# Patient Record
Sex: MALE | Race: BLACK OR AFRICAN AMERICAN | Employment: FULL TIME | URBAN - METROPOLITAN AREA
[De-identification: names, ages, dates, MRNs, and addresses within clinical notes are randomized per-mention and may not be internally consistent; named-entity substitution may affect disease eponyms.]

---

## 2020-09-27 ENCOUNTER — HOSPITAL ENCOUNTER (EMERGENCY)
Facility: CLINIC | Age: 20
Discharge: HOME | End: 2020-09-27
Attending: EMERGENCY MEDICINE | Admitting: EMERGENCY MEDICINE
Payer: COMMERCIAL

## 2020-09-27 ENCOUNTER — APPOINTMENT (OUTPATIENT)
Dept: CT IMAGING | Facility: CLINIC | Age: 20
End: 2020-09-27
Attending: EMERGENCY MEDICINE
Payer: COMMERCIAL

## 2020-09-27 VITALS
BODY MASS INDEX: 23.32 KG/M2 | TEMPERATURE: 98.4 F | OXYGEN SATURATION: 97 % | SYSTOLIC BLOOD PRESSURE: 119 MMHG | RESPIRATION RATE: 16 BRPM | WEIGHT: 153.88 LBS | DIASTOLIC BLOOD PRESSURE: 71 MMHG | HEART RATE: 71 BPM | HEIGHT: 68 IN

## 2020-09-27 DIAGNOSIS — R10.13 ABDOMINAL PAIN, EPIGASTRIC: ICD-10-CM

## 2020-09-27 DIAGNOSIS — I88.0 MESENTERIC ADENITIS: ICD-10-CM

## 2020-09-27 LAB
ALBUMIN SERPL-MCNC: 3.9 G/DL (ref 3.4–5)
ALP SERPL-CCNC: 69 U/L (ref 40–150)
ALT SERPL W P-5'-P-CCNC: 18 U/L (ref 0–70)
ANION GAP SERPL CALCULATED.3IONS-SCNC: 3 MMOL/L (ref 3–14)
AST SERPL W P-5'-P-CCNC: 12 U/L (ref 0–45)
BASOPHILS # BLD AUTO: 0.1 10E9/L (ref 0–0.2)
BASOPHILS NFR BLD AUTO: 1 %
BILIRUB SERPL-MCNC: 0.6 MG/DL (ref 0.2–1.3)
BUN SERPL-MCNC: 5 MG/DL (ref 7–30)
CALCIUM SERPL-MCNC: 8.6 MG/DL (ref 8.5–10.1)
CHLORIDE SERPL-SCNC: 106 MMOL/L (ref 94–109)
CO2 SERPL-SCNC: 29 MMOL/L (ref 20–32)
CREAT SERPL-MCNC: 0.74 MG/DL (ref 0.66–1.25)
DIFFERENTIAL METHOD BLD: ABNORMAL
EOSINOPHIL # BLD AUTO: 0.8 10E9/L (ref 0–0.7)
EOSINOPHIL NFR BLD AUTO: 10.9 %
ERYTHROCYTE [DISTWIDTH] IN BLOOD BY AUTOMATED COUNT: 13.2 % (ref 10–15)
GFR SERPL CREATININE-BSD FRML MDRD: >90 ML/MIN/{1.73_M2}
GLUCOSE SERPL-MCNC: 92 MG/DL (ref 70–99)
HCT VFR BLD AUTO: 44.4 % (ref 40–53)
HGB BLD-MCNC: 15.1 G/DL (ref 13.3–17.7)
IMM GRANULOCYTES # BLD: 0 10E9/L (ref 0–0.4)
IMM GRANULOCYTES NFR BLD: 0.3 %
LIPASE SERPL-CCNC: 166 U/L (ref 73–393)
LYMPHOCYTES # BLD AUTO: 2.5 10E9/L (ref 0.8–5.3)
LYMPHOCYTES NFR BLD AUTO: 36.2 %
MCH RBC QN AUTO: 28.4 PG (ref 26.5–33)
MCHC RBC AUTO-ENTMCNC: 34 G/DL (ref 31.5–36.5)
MCV RBC AUTO: 84 FL (ref 78–100)
MONOCYTES # BLD AUTO: 0.7 10E9/L (ref 0–1.3)
MONOCYTES NFR BLD AUTO: 9.5 %
NEUTROPHILS # BLD AUTO: 2.9 10E9/L (ref 1.6–8.3)
NEUTROPHILS NFR BLD AUTO: 42.1 %
NRBC # BLD AUTO: 0 10*3/UL
NRBC BLD AUTO-RTO: 0 /100
PLATELET # BLD AUTO: 265 10E9/L (ref 150–450)
POTASSIUM SERPL-SCNC: 3.7 MMOL/L (ref 3.4–5.3)
PROT SERPL-MCNC: 7.3 G/DL (ref 6.8–8.8)
RBC # BLD AUTO: 5.31 10E12/L (ref 4.4–5.9)
SODIUM SERPL-SCNC: 138 MMOL/L (ref 133–144)
WBC # BLD AUTO: 7 10E9/L (ref 4–11)

## 2020-09-27 PROCEDURE — 83690 ASSAY OF LIPASE: CPT | Performed by: EMERGENCY MEDICINE

## 2020-09-27 PROCEDURE — 96375 TX/PRO/DX INJ NEW DRUG ADDON: CPT

## 2020-09-27 PROCEDURE — 25000125 ZZHC RX 250: Performed by: EMERGENCY MEDICINE

## 2020-09-27 PROCEDURE — 99285 EMERGENCY DEPT VISIT HI MDM: CPT | Mod: 25

## 2020-09-27 PROCEDURE — 25000128 H RX IP 250 OP 636: Performed by: EMERGENCY MEDICINE

## 2020-09-27 PROCEDURE — 96374 THER/PROPH/DIAG INJ IV PUSH: CPT | Mod: 59

## 2020-09-27 PROCEDURE — 85025 COMPLETE CBC W/AUTO DIFF WBC: CPT | Performed by: EMERGENCY MEDICINE

## 2020-09-27 PROCEDURE — 96361 HYDRATE IV INFUSION ADD-ON: CPT

## 2020-09-27 PROCEDURE — 74177 CT ABD & PELVIS W/CONTRAST: CPT

## 2020-09-27 PROCEDURE — 25000132 ZZH RX MED GY IP 250 OP 250 PS 637: Performed by: EMERGENCY MEDICINE

## 2020-09-27 PROCEDURE — 25800030 ZZH RX IP 258 OP 636: Performed by: EMERGENCY MEDICINE

## 2020-09-27 PROCEDURE — 80053 COMPREHEN METABOLIC PANEL: CPT | Performed by: EMERGENCY MEDICINE

## 2020-09-27 RX ORDER — KETOROLAC TROMETHAMINE 15 MG/ML
15 INJECTION, SOLUTION INTRAMUSCULAR; INTRAVENOUS ONCE
Status: COMPLETED | OUTPATIENT
Start: 2020-09-27 | End: 2020-09-27

## 2020-09-27 RX ORDER — ONDANSETRON 2 MG/ML
4 INJECTION INTRAMUSCULAR; INTRAVENOUS EVERY 30 MIN PRN
Status: DISCONTINUED | OUTPATIENT
Start: 2020-09-27 | End: 2020-09-28 | Stop reason: HOSPADM

## 2020-09-27 RX ORDER — ONDANSETRON 4 MG/1
4 TABLET, ORALLY DISINTEGRATING ORAL EVERY 8 HOURS PRN
Qty: 10 TABLET | Refills: 0 | Status: SHIPPED | OUTPATIENT
Start: 2020-09-27 | End: 2020-09-30

## 2020-09-27 RX ORDER — HYDROCODONE BITARTRATE AND ACETAMINOPHEN 5; 325 MG/1; MG/1
1 TABLET ORAL EVERY 6 HOURS PRN
Qty: 8 TABLET | Refills: 0 | Status: SHIPPED | OUTPATIENT
Start: 2020-09-27 | End: 2020-09-30

## 2020-09-27 RX ORDER — IOPAMIDOL 755 MG/ML
76 INJECTION, SOLUTION INTRAVASCULAR ONCE
Status: COMPLETED | OUTPATIENT
Start: 2020-09-27 | End: 2020-09-27

## 2020-09-27 RX ADMIN — KETOROLAC TROMETHAMINE 15 MG: 15 INJECTION, SOLUTION INTRAMUSCULAR; INTRAVENOUS at 21:47

## 2020-09-27 RX ADMIN — LIDOCAINE HYDROCHLORIDE 30 ML: 20 SOLUTION ORAL; TOPICAL at 20:24

## 2020-09-27 RX ADMIN — SODIUM CHLORIDE 1000 ML: 9 INJECTION, SOLUTION INTRAVENOUS at 20:19

## 2020-09-27 RX ADMIN — ONDANSETRON 4 MG: 2 INJECTION INTRAMUSCULAR; INTRAVENOUS at 20:23

## 2020-09-27 RX ADMIN — SODIUM CHLORIDE 62 ML: 9 INJECTION, SOLUTION INTRAVENOUS at 21:19

## 2020-09-27 RX ADMIN — IOPAMIDOL 76 ML: 755 INJECTION, SOLUTION INTRAVENOUS at 21:18

## 2020-09-27 ASSESSMENT — ENCOUNTER SYMPTOMS
CHILLS: 0
DIARRHEA: 1
VOMITING: 0
NAUSEA: 1
DYSURIA: 0
ABDOMINAL PAIN: 1
FREQUENCY: 0
FEVER: 0

## 2020-09-27 ASSESSMENT — MIFFLIN-ST. JEOR: SCORE: 1682.5

## 2020-09-27 NOTE — ED AVS SNAPSHOT
Emergency Department  [address] [place] [address]  Phone:  [phone]  Fax:  [phone]                                    Catalina Casanova   MRN: [id number]    Department:   Emergency Department   Date of Visit:  9/27/2020           After Visit Summary Signature Page    I have received my discharge instructions, and my questions have been answered. I have discussed any challenges I see with this plan with the nurse or doctor.    ..........................................................................................................................................  Patient/Patient Representative Signature      ..........................................................................................................................................  Patient Representative Print Name and Relationship to Patient    ..................................................               ................................................  Date                                   Time    ..........................................................................................................................................  Reviewed by Signature/Title    ...................................................              ..............................................  Date                                               Time          22EPIC Rev 08/18

## 2020-09-28 NOTE — ED TRIAGE NOTES
Pt c/o RUQ 7/10 continuous abdominal pain that started 3 days ago and has progressively worsened. Denies any n/v/d.

## 2020-09-28 NOTE — ED PROVIDER NOTES
"  History     Chief Complaint:  Abdominal Pain      HPI   Catalina Casanova is a 19 year old male who presents with abdominal pain.  The patient reports waking up with epigastric abdominal pain and back pain 3 days ago.  Since then he has had ongoing epigastric pain which perhaps resolves for a couple hours in the evening but otherwise constant and waxing and waning in intensity.  His pain tends to be worse after eating.  He had diarrhea the first day he had pain and his stools are still looser but not actually diarrhea at this point.  He has been nauseous but denies any vomiting, fevers, or urinary symptoms.  He does not take NSAIDs regularly.  He tried Pepto Bismol without improvement.    Allergies:  No known drug allergies.      Medications:    Albuterol   Loratadine  Singulair      Past Medical History:    Seasonal allergies     Past Surgical History:    Tonsillectomy with adenoidectomy     Family History:    History reviewed. No pertinent family history.     Social History:  Presents to the ED with his boyfriend.  Tobacco Use: Current smoker  Alcohol Use: Occasional alcohol use.      Review of Systems   Constitutional: Negative for chills and fever.   Gastrointestinal: Positive for abdominal pain, diarrhea and nausea. Negative for vomiting.   Genitourinary: Negative for dysuria and frequency.   All other systems reviewed and are negative.    Physical Exam   First Vitals:  BP: 132/76  Pulse: 69  Temp: 98.4  F (36.9  C)  Resp: 20  Height: 172.7 cm (5' 8\")  Weight: 69.8 kg (153 lb 14.1 oz)  SpO2: 96 %      Physical Exam  Eyes:  The pupils are equal and round    Conjunctivae and sclerae are normal  ENT:    The nose is normal    Pinnae are normal  CV:  Regular rate and rhythm     No edema  Resp:  Lungs are clear    Non-labored    No rales    No wheezing   GI:  Abdomen is soft with tenderness in the mid and upper abdomen, there is no rigidity    No distension    No rebound tenderness   MS:  Normal muscular tone    No " asymmetric leg swelling  Skin:  No rash or acute skin lesions noted  Neuro:   Awake, alert.      Speech is normal and fluent.    Face is symmetric.     Moves all extremities      Emergency Department Course     Imaging:  Abdomen/Pelvis CT with IV contrast:  1.  Upper-normal size of peritoneal mesenteric lymph nodes could be associated with mesenteric adenitis or other inflammatory process. No overt lymphadenopathy is seen.   2.  Appendix is not well seen although no definite pericecal inflammatory changes are identified.   3.   Small bowel contains fluid and is minimally prominent but is still well within normal size criterion. Enteritis is not excluded.   4.   Etiology for patient's symptoms is not definitely identified.   Report per radiology.      Radiographic findings were communicated with the patient who voiced understanding of the findings.     Laboratory:  CBC: WNL (WBC 7, HGB 15.1, )   CMP: BUN 5 (L), otherwise WNL (Creatinine 0.74)  Lipase: 166    Interventions:   (2019) Normal Saline, 1 liter, IV bolus   (2023) Zofran, 4 mg, IV injection   (2024) GI Cocktail (Maalox/Mylanta and viscous Lidocaine), 30 mL suspension, PO    (2147) Toradol, 15 mg, IV injection     Emergency Department Course:  Nursing notes and vitals reviewed.  I performed an exam of the patient as documented above.    A peripheral IV was established. Blood was drawn from the patient. This was sent for laboratory testing, findings above.       The patient was sent for a CT scan of the abdomen and pelvis while in the emergency department, findings above.      Findings and plan explained to the patient. Patient discharged home with instructions regarding supportive care, medications, and reasons to return. The importance of close follow-up was reviewed. The patient was prescribed Norco and Zofran.     Impression & Plan      Medical Decision Making:  MihirJadelore Casanova is a 19 year old male who presents the emergency department with  abdominal pain, nausea and loose stools.  He had diarrhea on the first day of his symptoms.  He here appears clinically well.  He has tenderness in his epigastric and mid abdomen.  Labs were obtained which did not reveal any significant abnormalities.  He was still having discomfort so CT scan was performed of his abdomen pelvis.  He is not note any improvement after GI cocktail.  He did note some improvement eventually after a dose of Toradol.  CT scan did not show any significant acute abnormalities.  There is some evidence of possibly gastroenteritis or diarrhea.  There is some enlarged lymph nodes in his abdomen raising the possibility of mesenteric adenitis.  Overall he appears well.  His blood work is reassuring and CT scan did not reveal any concerning abnormalities.  He was given a few doses of pain medication and antinausea medication to use at home.  He should follow-up with his doctor.  Return with any new or worsening symptoms.    Diagnosis:    ICD-10-CM    1. Mesenteric adenitis  I88.0    2. Abdominal pain, epigastric  R10.13        Disposition:  Discharged to home.     Discharge Medications:  New Prescriptions    HYDROCODONE-ACETAMINOPHEN (NORCO) 5-325 MG TABLET    Take 1 tablet by mouth every 6 hours as needed for breakthrough pain or severe pain    ONDANSETRON (ZOFRAN ODT) 4 MG ODT TAB    Take 1 tablet (4 mg) by mouth every 8 hours as needed       I, Manuela Gunderson, am serving as a scribe on 9/27/2020 at 7:56 PM to personally document services performed by Emanuel Hernandez based on my observations and the provider's statements to me.   Manuela Gunderson  9/27/2020    EMERGENCY DEPARTMENT       Emanuel Hernandez MD  09/28/20 0107

## 2021-01-16 ENCOUNTER — NURSE TRIAGE (OUTPATIENT)
Dept: NURSING | Facility: CLINIC | Age: 21
End: 2021-01-16

## 2021-01-17 ENCOUNTER — HOSPITAL ENCOUNTER (EMERGENCY)
Facility: CLINIC | Age: 21
Discharge: HOME | End: 2021-01-17
Attending: EMERGENCY MEDICINE | Admitting: EMERGENCY MEDICINE
Payer: COMMERCIAL

## 2021-01-17 VITALS
OXYGEN SATURATION: 100 % | HEART RATE: 94 BPM | DIASTOLIC BLOOD PRESSURE: 82 MMHG | SYSTOLIC BLOOD PRESSURE: 142 MMHG | RESPIRATION RATE: 14 BRPM | TEMPERATURE: 98.8 F

## 2021-01-17 DIAGNOSIS — H61.23 BILATERAL IMPACTED CERUMEN: ICD-10-CM

## 2021-01-17 DIAGNOSIS — H91.92 HEARING PROBLEM OF LEFT EAR: ICD-10-CM

## 2021-01-17 PROCEDURE — 99283 EMERGENCY DEPT VISIT LOW MDM: CPT

## 2021-01-17 PROCEDURE — 250N000009 HC RX 250: Performed by: EMERGENCY MEDICINE

## 2021-01-17 RX ORDER — ACETAMINOPHEN 160 MG
100 TABLET,DISINTEGRATING ORAL ONCE
Status: COMPLETED | OUTPATIENT
Start: 2021-01-17 | End: 2021-01-17

## 2021-01-17 RX ADMIN — HYDROGEN PEROXIDE 100 ML: 30 LIQUID TOPICAL at 01:45

## 2021-01-17 NOTE — DISCHARGE INSTRUCTIONS
*You may resume diet and activities.  *No new medications.  *Return if you develop fever, ear pain, recurrent hearing changes or become worse in any way.

## 2021-01-17 NOTE — ED AVS SNAPSHOT
[facility] Emergency Dept  [address] [address]  Phone: [phone]  Fax: [phone]                                    Mihir Casanova   MRN: [id number]    Department: [facility] Emergency Dept   Date of Visit: 1/17/2021           After Visit Summary Signature Page    I have received my discharge instructions, and my questions have been answered. I have discussed any challenges I see with this plan with the nurse or doctor.    ..........................................................................................................................................  Patient/Patient Representative Signature      ..........................................................................................................................................  Patient Representative Print Name and Relationship to Patient    ..................................................               ................................................  Date                                   Time    ..........................................................................................................................................  Reviewed by Signature/Title    ...................................................              ..............................................  Date                                               Time          22EPIC Rev 08/18

## 2021-01-17 NOTE — TELEPHONE ENCOUNTER
Mihir Shon calls and says that he woke up this morning and could not hear anything out of his left ear. Pt. Says that he put ear drops and hydrogen peroxide in his ear to help him, but that did not help him. Pt. Says that he is very worried and is on his way to an ER now. COVID 19 Nurse Triage Plan/Patient Instructions    Please be aware that novel coronavirus (COVID-19) may be circulating in the community. If you develop symptoms such as fever, cough, or SOB or if you have concerns about the presence of another infection including coronavirus (COVID-19), please contact your health care provider or visit [email or web address].     Disposition/Instructions    In-Person Visit with provider recommended. Reference Visit Selection Guide.    Thank you for taking steps to prevent the spread of this virus.  o Limit your contact with others.  o Wear a simple mask to cover your cough.  o Wash your hands well and often.    Resources    [facility]: About COVID-19: [email or web address]    CDC: What to Do If You're Sick: www.cdc.gov/coronavirus/2019-ncov/about/steps-when-sick.html    CDC: Ending Home Isolation: www.cdc.gov/coronavirus/2019-ncov/hcp/disposition-in-home-patients.html     CDC: Caring for Someone: www.cdc.gov/coronavirus/2019-ncov/if-you-are-sick/care-for-someone.html     [facility]: Interim Guidance for Hospital Discharge to Home: www.health.[facility].mn.us/diseases/coronavirus/hcp/hospdischarge.pdf    [facility] clinical trials (COVID-19 research studies): [email or web address]/umn-[email or web address]-trials     Below are the COVID-19 hotlines at the [facility] of Health ([facility]). Interpreters are available.   o For health questions: Call 086-484-4959 or 1-469.295.9998 (7 a.m. to 7 p.m.)  o For questions about schools and childcare: Call 558-980-3658 or 1-765.410.5129 (7 a.m. to 7 p.m.)                     Reason for Disposition    [1] Nasal allergies AND [2] only certain times of year AND [3] hay fever  diagnosis has never been confirmed by a HCP    Additional Information    Negative: Followed an ear injury    Decreased hearing with nasal allergies    Negative: [1] Wheezing (high pitched whistling sound) AND [2] previous asthma attacks or use of asthma medicines    Negative: [1] Wheezing (high pitched whistling sound) AND [2] no history of asthma    Negative: Eye redness and itching are the only symptoms    Negative: Doesn't match the SYMPTOMS for Hay Fever    Negative: Patient sounds very sick or weak to the triager    Negative: Lots of coughing    Negative: [1] Lots of yellow or green discharge from nose AND [2] present > 3 days    Negative: [1] Taking antihistamines > 2 days AND [2] nasal allergy symptoms interfere with sleep, school, or work    Protocols used: NASAL ALLERGIES (HAY FEVER)-A-AH, HEARING LOSS OR CHANGE-A-AH

## 2021-01-17 NOTE — ED TRIAGE NOTES
Pt has pain to left ear and is unable to hear from it. Started yesterday. Denies drainage from it. He put hydrogen peroxide in it and it did not help.

## 2021-01-17 NOTE — ED PROVIDER NOTES
History   Chief Complaint:  Hearing Problem and Otalgia       HPI   Mihir Casanova is a 20 year old male with history of asthma who presents with decreased hearing and a feeling of a clogged ear on the left side.  He tried hydrogen peroxide in the ear but it did not seem to resolve the issue.  He reports that he deals with earwax.  The hearing changes got worse starting yesterday.  No fever, severe pain in the left side, pain in the right side, rash.  No other symptoms.    Review of Systems  ROS per HPI.  Remainder of 10 point ROS negative.    Allergies:  No known drug allergies.     Medications:  Albuterol inhaler  Loratadine   Singulair    Past Medical History:    Seasonal allergies    Past Surgical History:    Tonsillectomy with adenoidectomy      Social History:  Presents to the ED alone.    Physical Exam     Patient Vitals for the past 24 hrs:   BP Temp Temp src Pulse Resp SpO2   01/17/21 0021 -- 98.8  F (37.1  C) Oral -- -- --   01/17/21 0020 (!) 142/82 -- -- 94 14 100 %       Physical Exam  General: Well-nourished, no acute distress, speaking in loud voice  Eyes: PERRL, conjunctivae pink no scleral icterus or conjunctival injection  ENT:  Moist mucus membranes, posterior oropharynx clear, TM with cerumen impaction bilaterally, decreased hearing in left ear.  After cerumen removed with irrigation, tympanic membranes appear intact bilaterally.  No signs of abrasions or lacerations.  No signs of otitis externa.  No signs of mastoiditis.  Patient reports normal hearing and volume of voice is decreased.  Respiratory:  No respiratory distress  CV: Normal rate   Skin: Warm, dry.  No rashes or petechiae  Musculoskeletal: No peripheral edema or calf tenderness  Neuro: Alert and oriented to person/place/time  Psychiatric: Normal affect    Emergency Department Course     Emergency Department Course:  Reviewed:  I reviewed nursing notes and vitals    Assessments:  (0025) I obtained history and examined the patient as  noted above.   (0114) I rechecked the patient after ear irrigation. He still has impacted cerumen and hearing is diminished.   (0200) I rechecked the patient, symptoms are improved after further ear irrigation and removal of impacted debris.    Disposition:  The patient was discharged to home.     Impression & Plan     Medical Decision Making:  Mihir Casanova is a 20 year old male who comes with bilateral cerumen impaction and decreased hearing in his left ear.  Very large pieces of cerumen were removed from both ears.  Afterwards, exam reveals the tympanic membrane without signs of rupture, otitis media, otitis externa or mastoiditis.  His hearing has been decreased on arrival but once the cerumen was removed he reported normal hearing.  He is to follow-up with the ENT should he develop recurrence.  At this time, with reasonable clinical confidence, I do feel he safe for discharge home.    Diagnosis:    ICD-10-CM    1. Bilateral impacted cerumen  H61.23    2. Hearing problem of left ear  H91.92          Scribe Disclosure:  I, Manuela Gunderson, am serving as a scribe at 12:52 AM on 1/17/2021 to document services personally performed by Jazmin Beltre MD based on my observations and the provider's statements to me.           Jazmin Beltre MD  01/17/21 1633

## 2021-05-31 ENCOUNTER — RECORDS - HEALTHEAST (OUTPATIENT)
Dept: ADMINISTRATIVE | Facility: CLINIC | Age: 21
End: 2021-05-31

## 2024-05-29 ENCOUNTER — HOSPITAL ENCOUNTER (EMERGENCY)
Facility: CLINIC | Age: 24
Discharge: HOME | End: 2024-05-29
Attending: EMERGENCY MEDICINE | Admitting: EMERGENCY MEDICINE
Payer: COMMERCIAL

## 2024-05-29 VITALS
OXYGEN SATURATION: 93 % | SYSTOLIC BLOOD PRESSURE: 133 MMHG | HEART RATE: 92 BPM | DIASTOLIC BLOOD PRESSURE: 85 MMHG | TEMPERATURE: 98.8 F | RESPIRATION RATE: 14 BRPM

## 2024-05-29 DIAGNOSIS — F10.920 ALCOHOLIC INTOXICATION WITHOUT COMPLICATION (H): ICD-10-CM

## 2024-05-29 DIAGNOSIS — R45.1 AGITATION: ICD-10-CM

## 2024-05-29 LAB — ETHANOL SERPL-MCNC: 0.29 G/DL

## 2024-05-29 PROCEDURE — 36415 COLL VENOUS BLD VENIPUNCTURE: CPT | Performed by: EMERGENCY MEDICINE

## 2024-05-29 PROCEDURE — 82077 ASSAY SPEC XCP UR&BREATH IA: CPT | Performed by: EMERGENCY MEDICINE

## 2024-05-29 PROCEDURE — 96372 THER/PROPH/DIAG INJ SC/IM: CPT | Performed by: EMERGENCY MEDICINE

## 2024-05-29 PROCEDURE — 99285 EMERGENCY DEPT VISIT HI MDM: CPT | Mod: 25

## 2024-05-29 PROCEDURE — 250N000011 HC RX IP 250 OP 636: Performed by: EMERGENCY MEDICINE

## 2024-05-29 RX ORDER — LORAZEPAM 2 MG/ML
2 INJECTION INTRAMUSCULAR ONCE
Status: COMPLETED | OUTPATIENT
Start: 2024-05-29 | End: 2024-05-29

## 2024-05-29 RX ORDER — HALOPERIDOL 5 MG/ML
5 INJECTION INTRAMUSCULAR ONCE
Status: COMPLETED | OUTPATIENT
Start: 2024-05-29 | End: 2024-05-29

## 2024-05-29 RX ORDER — DIPHENHYDRAMINE HYDROCHLORIDE 50 MG/ML
50 INJECTION INTRAMUSCULAR; INTRAVENOUS ONCE
Status: COMPLETED | OUTPATIENT
Start: 2024-05-29 | End: 2024-05-29

## 2024-05-29 RX ADMIN — LORAZEPAM 2 MG: 2 INJECTION INTRAMUSCULAR; INTRAVENOUS at 08:45

## 2024-05-29 RX ADMIN — HALOPERIDOL LACTATE 5 MG: 5 INJECTION, SOLUTION INTRAMUSCULAR at 08:45

## 2024-05-29 RX ADMIN — DIPHENHYDRAMINE HYDROCHLORIDE 50 MG: 50 INJECTION, SOLUTION INTRAMUSCULAR; INTRAVENOUS at 08:45

## 2024-05-29 ASSESSMENT — COLUMBIA-SUICIDE SEVERITY RATING SCALE - C-SSRS
2. HAVE YOU ACTUALLY HAD ANY THOUGHTS OF KILLING YOURSELF IN THE PAST MONTH?: NO
6. HAVE YOU EVER DONE ANYTHING, STARTED TO DO ANYTHING, OR PREPARED TO DO ANYTHING TO END YOUR LIFE?: NO
1. IN THE PAST MONTH, HAVE YOU WISHED YOU WERE DEAD OR WISHED YOU COULD GO TO SLEEP AND NOT WAKE UP?: NO

## 2024-05-29 ASSESSMENT — ACTIVITIES OF DAILY LIVING (ADL)
ADLS_ACUITY_SCORE: 35

## 2024-05-29 NOTE — ED NOTES
Patient demanding his phone, vape, and belongings to leave. Patient educated no vapes are allowed to be use on hospital grounds, and offered patient a nicotine patch.

## 2024-05-29 NOTE — ED NOTES
Patient awake walking around. Able to ambulate to the bathroom. MD notified, patient ready for discharge.

## 2024-05-29 NOTE — ED NOTES
Bed: ED17  Expected date: 5/29/24  Expected time: 7:05 AM  Means of arrival: Ambulance  Comments:  Maria [address] 23M alcohol indulgence

## 2024-05-29 NOTE — ED PROVIDER NOTES
Emergency Department Note      History of Present Illness     Chief Complaint  Alcohol Intoxication (Pt was at [facility] with friends.  Intoxicated and harassing people at the hotel.  On a PD hold.    Bgm 136.  Pt yelling at staff and uncooperative, refuses to stay in room)    RADHA  iMhir Casanova is a 23 year old male presenting to the emergency department for evaluation of alcohol intoxication. Police were called to the hotel the patient was staying at due to the patient appearing intoxicated and reportedly harassing people at the hotel.  He arrives on a JOHN by police.  He has been screaming and yelling ever since he has been here.  He is calling people racist and is threatening to kay everyone.  He does calm down for me after some redirection and states that he was asked by a friend of his who is a prostitute to stand by the door the hotel rooms and nothing happens to her.  He says he did not do anything wrong and repeats over and over again that he is an [place] citizen and is illegal to drink alcohol.  He is quite agitated and is occasionally redirectable but continues to wrap up and elevate his mood.    Independent Historian  None    Review of External Notes  None  Past Medical History   Medical History and Problem List  Depression  Anxiety   Asthma     Medications  Albuterol  Loratadine   Singular PO    Surgical History   ENT surgery    Physical Exam   Patient Vitals for the past 24 hrs:   BP Temp Temp src Pulse Resp SpO2   05/29/24 1131 -- -- -- 92 14 93 %   05/29/24 1130 -- -- -- -- -- 93 %   05/29/24 0953 -- -- -- -- -- 97 %   05/29/24 0945 -- -- -- -- 12 97 %   05/29/24 0930 -- -- -- -- 14 --   05/29/24 0915 -- -- -- -- 14 92 %   05/29/24 0900 -- -- -- -- 14 --   05/29/24 0715 133/85 98.8  F (37.1  C) Temporal (!) 123 16 --     Physical Exam  Constitutional: Vital signs reviewed.    HEENT: Moist mucous membranes  Cardiovascular: He is tachycardic.   Pulmonary/Chest: Breathing  comfortably on room air.  No audible wheezing  Musculoskeletal/Extremities: No bony deformities.  Moves all 4 extremities without difficulty.  Neurological: Alert.  No focal deficits.  Endo: No pitting edema  Skin: No visible rash.  Psychiatric: He is agitated and clearly intoxicated. Redirectable at times     Diagnostics   Lab Results   Labs Ordered and Resulted from Time of ED Arrival to Time of ED Departure   ETHYL ALCOHOL LEVEL - Abnormal       Result Value    Alcohol ethyl 0.29 (*)        Imaging  No orders to display       Independent Interpretation  None  ED Course    Medications Administered  Medications   haloperidol lactate (HALDOL) injection 5 mg (5 mg Intramuscular $Given 5/29/24 0845)   diphenhydrAMINE (BENADRYL) injection 50 mg (50 mg Intramuscular $Given 5/29/24 0845)   LORazepam (ATIVAN) injection 2 mg (2 mg Intramuscular $Given 5/29/24 0845)       Procedures  Procedures     Discussion of Management  None    Social Determinants of Health adding to complexity of care  None    ED Course  ED Course as of 05/29/24 1228   Wed May 29, 2024   0720 I obtained history and examined the patient as noted above.      Medical Decision Making / Diagnosis   CMS Diagnoses: None    MIPS     None    MDM  Ja Donald Casanova is a 23 year old male who presents to the emergency department obviously intoxicated on a PD hold for his part in an alleged disturbance at the [facility].  He is initially very agitated and angry but I am able to de-escalate him for a time.  He has no medical concerns at this time is repeating over and over again he does want to go.  I informed that he could find a sober ride and he was cooperative we can get him home.  Unfortunately was unable to get a ride and he started escalating once again to the point of complete disruption of the department.  He is screaming and yelling and calling people obscenities.  He is no longer able to de-escalate.  Given that he is on the hold and his actions are  threatening to others and himself we did give him a B-52.  He was initially put in restraints.  He is now sleeping comfortably and is removed from the restraints.  His blood alcohol did come back at 0.29.  He can be reevaluated once sober and likely discharged to home.    Disposition  The patient was discharged.     ICD-10 Codes:    ICD-10-CM    1. Agitation  R45.1       2. Alcoholic intoxication without complication (H24)  F10.920            Discharge Medications  New Prescriptions    No medications on file         Scribe Disclosure:  I, Billy Moody, am serving as a scribe at 7:39 AM on 5/29/2024 to document services personally performed by Matias Shine MD based on my observations and the provider's statements to me.        Matias Shine MD  05/29/24 2866

## 2024-05-29 NOTE — ED NOTES
Patient educated that if he can get a sober ride to come and pick him up he can go home. Patient given phone to get his contacts off of it.

## 2024-05-29 NOTE — ED TRIAGE NOTES
Pt was at [facility] with friends.  Intoxicated and harassing people at the hotel.  On a PD hold.    Bgm 136.  Pt yelling at staff and uncooperative, refuses to stay in room.     Triage Assessment (Adult)       Row Name 05/29/24 0732          Triage Assessment    Airway WDL WDL        Respiratory WDL    Respiratory WDL WDL        Skin Circulation/Temperature WDL    Skin Circulation/Temperature WDL WDL        Cardiac WDL    Cardiac WDL X   tachy        Peripheral/Neurovascular WDL    Peripheral Neurovascular WDL WDL        Cognitive/Neuro/Behavioral WDL    Cognitive/Neuro/Behavioral WDL X   confused and uncooperative

## 2024-05-29 NOTE — ED NOTES
Patient continues to come out to the nursing station, asking to leave and asking for his phone. Patient escalating, calling staff names and not redirectable, patient picked up a bedside table. MD at bedside

## 2024-05-29 NOTE — ED NOTES
PRN medications, given, patient continues to escalate, banging on walls, and screaming, patient placed into restraints.

## 2024-05-29 NOTE — ED NOTES
Patient sleeping on bed, on his side, repositioning self independently. Breathing even and not labored.

## 2024-09-29 ENCOUNTER — OFFICE VISIT (OUTPATIENT)
Dept: URGENT CARE | Facility: URGENT CARE | Age: 24
End: 2024-09-29
Payer: OTHER MISCELLANEOUS

## 2024-09-29 ENCOUNTER — ANCILLARY PROCEDURE (OUTPATIENT)
Dept: GENERAL RADIOLOGY | Facility: CLINIC | Age: 24
End: 2024-09-29
Attending: PHYSICIAN ASSISTANT
Payer: COMMERCIAL

## 2024-09-29 VITALS
DIASTOLIC BLOOD PRESSURE: 85 MMHG | SYSTOLIC BLOOD PRESSURE: 125 MMHG | OXYGEN SATURATION: 98 % | TEMPERATURE: 97.5 F | HEART RATE: 69 BPM

## 2024-09-29 DIAGNOSIS — S99.912A ANKLE INJURY, LEFT, INITIAL ENCOUNTER: Primary | ICD-10-CM

## 2024-09-29 PROCEDURE — 73630 X-RAY EXAM OF FOOT: CPT | Mod: TC | Performed by: RADIOLOGY

## 2024-09-29 PROCEDURE — 99204 OFFICE O/P NEW MOD 45 MIN: CPT | Performed by: PHYSICIAN ASSISTANT

## 2024-09-29 PROCEDURE — 73610 X-RAY EXAM OF ANKLE: CPT | Mod: TC | Performed by: INTERNAL MEDICINE

## 2024-09-29 NOTE — PROGRESS NOTES
Ankle injury, left, initial encounter  - XR Ankle Left G/E 3 Views  - XR Foot Left G/E 3 Views    General Tips for All Ages:    R.I.C.E Method:  Why: This helps reduce swelling and promotes healing.  What to Do:  Rest: Allow the injured area to rest.  Ice: Apply an ice pack for 15-20 minutes every 2-3 hours.  Compression: Use a compression bandage to control swelling.  Elevation: Elevate the injured limb to reduce swelling.    Over-the-Counter (OTC) Pain Relievers:  Why: Manages pain and reduces inflammation.  What to Use:  All Ages: Acetaminophen or ibuprofen as directed on the package.    Avoid H.A.R.M:  Why: To prevent further injury.  What to Avoid:  Heat: Avoid heat packs initially.  Alcohol: Can increase swelling.  Running: Until you receive clearance from a healthcare provider.  Massage: Avoid direct pressure on the injured area.    For Adults (18 Years and Older):    Physical Therapy (if prescribed):  Why: Helps restore strength and flexibility.  What to Do: Follow the prescribed exercises and attend therapy sessions.    Bracing/Taping (if prescribed):  Why: Provides support during recovery.  What to Do: Use braces or tape as directed by your healthcare provider.    For Adolescents (12-17 Years):    OTC Pain Relievers:  Why: Manages pain and reduces inflammation.  What to Use: Acetaminophen or ibuprofen as directed on the package.    Physical Therapy (if prescribed):  Why: Aids in regaining strength and flexibility.  What to Do: Follow the exercises given by your physical therapist.    For Children (Under 12 Years):    Pediatrician Consultation:  Why: Children's injuries may need specialized care.  What to Do: Consult your child's pediatrician for guidance.    OTC Pain Relievers (if approved by pediatrician):  Why: Manages pain and reduces inflammation.  What to Use: Follow your pediatrician's advice on using acetaminophen or ibuprofen.    All Ages:    Hydration and Nutrition:  Why: Essential for  healing.  What to Do: Stay hydrated and ensure a balanced diet rich in vitamins and minerals.    Restorative Sleep:  Why: Crucial for recovery.  What to Do: Ensure you get adequate and quality sleep.  Follow-Up:    Patient advised to return to clinic for reevaluation (either UC or PCP) if symptoms do not improve in 14 days. Patient educated on red flag symptoms and asked to go directly to the ED if these symptoms present themselves.     Remember, individual cases may vary, and it's crucial to follow your healthcare provider's advice. If you have concerns or if symptoms persist, don't hesitate to reach out for further guidance.      BRADLEY Case [facility]    Subjective   23 year old who presents to clinic today for the following health issues:    Urgent Care       HPI     Where in your body do you have pain? Musculoskeletal problem/pain  Onset/Duration: Left leg pain from giant fridge door closing on it- Happened on Friday   Description  Location: Left foot and ankle   Joint Swelling: No  Redness: No  Pain: YES  Warmth: No  Intensity:  moderate  Progression of Symptoms:  worsening  Accompanying signs and symptoms:   Fevers: No  Numbness/tingling/weakness: No  History  Trauma to the area: YES  Recent illness:  No  Previous similar problem: No  Previous evaluation:  No  Precipitating or alleviating factors:  Aggravating factors include: Walking and pulling weight   Therapies tried and outcome: nothing    Review of Systems   Review of Systems   See HPI    Objective    Temp: 97.5  F (36.4  C) Temp src: Tympanic BP: 125/85 Pulse: 69     SpO2: 98 %       Physical Exam   Physical Exam  Constitutional:       General: He is not in acute distress.     Appearance: Normal appearance. He is normal weight. He is not ill-appearing, toxic-appearing or diaphoretic.   HENT:      Head: Normocephalic and atraumatic.   Cardiovascular:      Rate and Rhythm: Normal rate.      Pulses: Normal pulses.    Pulmonary:      Effort: Pulmonary effort is normal. No respiratory distress.   Musculoskeletal:      Right ankle: Normal. No swelling or deformity. No tenderness. Normal range of motion.      Left ankle: Normal. No swelling or deformity. No tenderness. Normal range of motion.      Right foot: Normal. Normal range of motion. No swelling, deformity or tenderness.      Left foot: Normal. Normal range of motion. No swelling, deformity or tenderness.   Neurological:      General: No focal deficit present.      Mental Status: He is alert and oriented to person, place, and time. Mental status is at baseline.      Gait: Gait normal.   Psychiatric:         Mood and Affect: Mood normal.         Behavior: Behavior normal.         Thought Content: Thought content normal.         Judgment: Judgment normal.          No results found for this or any previous visit (from the past 24 hour(s)).

## 2024-10-14 ENCOUNTER — OFFICE VISIT (OUTPATIENT)
Dept: URGENT CARE | Facility: URGENT CARE | Age: 24
End: 2024-10-14
Payer: COMMERCIAL

## 2024-10-14 VITALS
SYSTOLIC BLOOD PRESSURE: 119 MMHG | BODY MASS INDEX: 29.19 KG/M2 | WEIGHT: 192 LBS | HEART RATE: 54 BPM | OXYGEN SATURATION: 100 % | DIASTOLIC BLOOD PRESSURE: 82 MMHG | TEMPERATURE: 97.2 F

## 2024-10-14 DIAGNOSIS — R30.0 DYSURIA: Primary | ICD-10-CM

## 2024-10-14 LAB
ALBUMIN UR-MCNC: NEGATIVE MG/DL
APPEARANCE UR: CLEAR
BILIRUB UR QL STRIP: NEGATIVE
COLOR UR AUTO: YELLOW
GLUCOSE UR STRIP-MCNC: NEGATIVE MG/DL
HGB UR QL STRIP: NEGATIVE
KETONES UR STRIP-MCNC: NEGATIVE MG/DL
LEUKOCYTE ESTERASE UR QL STRIP: NEGATIVE
NITRATE UR QL: NEGATIVE
PH UR STRIP: 6 [PH] (ref 5–7)
SP GR UR STRIP: 1.02 (ref 1–1.03)
UROBILINOGEN UR STRIP-ACNC: 0.2 E.U./DL

## 2024-10-14 PROCEDURE — 81003 URINALYSIS AUTO W/O SCOPE: CPT | Performed by: FAMILY MEDICINE

## 2024-10-14 PROCEDURE — 96372 THER/PROPH/DIAG INJ SC/IM: CPT | Performed by: FAMILY MEDICINE

## 2024-10-14 PROCEDURE — 87591 N.GONORRHOEAE DNA AMP PROB: CPT | Performed by: FAMILY MEDICINE

## 2024-10-14 PROCEDURE — 87491 CHLMYD TRACH DNA AMP PROBE: CPT | Performed by: FAMILY MEDICINE

## 2024-10-14 PROCEDURE — 99214 OFFICE O/P EST MOD 30 MIN: CPT | Mod: 25 | Performed by: FAMILY MEDICINE

## 2024-10-14 RX ORDER — DOXYCYCLINE 100 MG/1
100 TABLET ORAL 2 TIMES DAILY
Qty: 14 TABLET | Refills: 0 | Status: SHIPPED | OUTPATIENT
Start: 2024-10-14 | End: 2024-10-21

## 2024-10-14 RX ORDER — CEFTRIAXONE SODIUM 1 G
500 VIAL (EA) INJECTION ONCE
Status: COMPLETED | OUTPATIENT
Start: 2024-10-14 | End: 2024-10-14

## 2024-10-14 RX ADMIN — Medication 500 MG: at 19:26

## 2024-10-15 NOTE — PROGRESS NOTES
SUBJECTIVE: Mihir Casanova is a 23 year old male who  presents today for a possible UTI.   Symptoms of dysuria have been going on for day(s).      Past Medical History:   Diagnosis Date    Seasonal allergies      Allergies   Allergen Reactions    Cats     Dog Epithelium (Canis Lupus Familiaris) Other (See Comments)    Food      avocado    Molds & Smuts      Other reaction(s): Runny Nose    No Clinical Screening - See Comments Other (See Comments)     swelling     Social History     Tobacco Use    Smoking status: Former    Smokeless tobacco: Never    Tobacco comments:     Vapes.   Substance Use Topics    Alcohol use: Yes       ROS: CONSTITUTIONAL:NEGATIVE for fever, chills, change in weight    OBJECTIVE:  /82   Pulse 54   Temp 97.2  F (36.2  C) (Tympanic)   Wt 87.1 kg (192 lb)   SpO2 100%   BMI 29.19 kg/m    NAD  No Flank/abd pain      ICD-10-CM    1. Dysuria  R30.0 UA Macroscopic with reflex to Microscopic and Culture - Clinic Collect     Chlamydia & Gonorrhea by PCR, [facility] - Clinic Collect     cefTRIAXone (ROCEPHIN) in lidocaine 1% (PF) for IM administration only 500 mg     doxycycline monohydrate (ADOXA) 100 MG tablet          Drink plenty of fluids.  Prevention and treatment of UTI's discussed.Signs and symptoms of pyelonephritis mentioned.  Follow up with primary care physician if not improving

## 2024-10-16 LAB
C TRACH DNA SPEC QL PROBE+SIG AMP: NEGATIVE
N GONORRHOEA DNA SPEC QL NAA+PROBE: NEGATIVE

## 2024-10-17 ENCOUNTER — TELEPHONE (OUTPATIENT)
Dept: URGENT CARE | Facility: URGENT CARE | Age: 24
End: 2024-10-17
Payer: COMMERCIAL

## 2024-10-17 NOTE — TELEPHONE ENCOUNTER
Test Results    Contacts       Contact Date/Time Type Contact Phone/Fax    10/17/2024 11:50 AM CDT Phone (Incoming) Mihir Campos (Self) [phone] (M)            Who ordered the test:      Type of test: Lab    Date of test:  10/14/24    Where was the test performed:  [facility]    What are your questions/concerns?:  would like a team member to call back with results.    Okay to leave a detailed message?: Yes at Cell number on file:    Telephone Information:   Mobile [phone]

## 2024-10-17 NOTE — TELEPHONE ENCOUNTER
Rn spoke with pt regarding results. Pt is still having symptoms and not seeing any improvement. RN advised to get re-checked if not having any improvement. Pt stated that they will start to take medication and will come back and get rechecked tomorrow is not seeing any improvement to symptoms.       Pt verbalized understanding and was agreeable to plan.      Amita BARRAZA RN

## 2024-11-23 ENCOUNTER — HEALTH MAINTENANCE LETTER (OUTPATIENT)
Age: 24
End: 2024-11-23

## 2025-01-20 ENCOUNTER — OFFICE VISIT (OUTPATIENT)
Dept: URGENT CARE | Facility: URGENT CARE | Age: 25
End: 2025-01-20
Payer: COMMERCIAL

## 2025-01-20 VITALS
SYSTOLIC BLOOD PRESSURE: 111 MMHG | WEIGHT: 198 LBS | TEMPERATURE: 98.6 F | BODY MASS INDEX: 30.11 KG/M2 | OXYGEN SATURATION: 94 % | DIASTOLIC BLOOD PRESSURE: 72 MMHG | HEART RATE: 82 BPM | RESPIRATION RATE: 22 BRPM

## 2025-01-20 DIAGNOSIS — J30.2 SEASONAL ALLERGIC RHINITIS, UNSPECIFIED TRIGGER: ICD-10-CM

## 2025-01-20 DIAGNOSIS — J06.9 UPPER RESPIRATORY TRACT INFECTION, UNSPECIFIED TYPE: Primary | ICD-10-CM

## 2025-01-20 DIAGNOSIS — J45.901 MODERATE ASTHMA WITH EXACERBATION, UNSPECIFIED WHETHER PERSISTENT: ICD-10-CM

## 2025-01-20 PROCEDURE — 99214 OFFICE O/P EST MOD 30 MIN: CPT | Performed by: PHYSICIAN ASSISTANT

## 2025-01-20 RX ORDER — ALBUTEROL SULFATE 90 UG/1
2 INHALANT RESPIRATORY (INHALATION) EVERY 6 HOURS
Qty: 8.5 G | Refills: 0 | Status: SHIPPED | OUTPATIENT
Start: 2025-01-20

## 2025-01-20 RX ORDER — CETIRIZINE HYDROCHLORIDE 10 MG/1
10 TABLET ORAL DAILY
Qty: 30 TABLET | Refills: 0 | Status: SHIPPED | OUTPATIENT
Start: 2025-01-20

## 2025-01-20 RX ORDER — BENZONATATE 200 MG/1
200 CAPSULE ORAL 3 TIMES DAILY PRN
Qty: 21 CAPSULE | Refills: 0 | Status: SHIPPED | OUTPATIENT
Start: 2025-01-20 | End: 2025-01-27

## 2025-01-20 RX ORDER — PREDNISONE 20 MG/1
20 TABLET ORAL 2 TIMES DAILY
Qty: 10 TABLET | Refills: 0 | Status: SHIPPED | OUTPATIENT
Start: 2025-01-20

## 2025-01-20 RX ORDER — MONTELUKAST SODIUM 10 MG/1
10 TABLET ORAL AT BEDTIME
Qty: 30 TABLET | Refills: 0 | Status: SHIPPED | OUTPATIENT
Start: 2025-01-20

## 2025-01-20 NOTE — PROGRESS NOTES
Assessment & Plan     Upper respiratory tract infection, unspecified type    You have been diagnosed with a viral URI.  A viral respiratory infection is an infection of the nose, sinuses, or throat caused by a virus. Colds and the flu are common types of viral respiratory infections.  The symptoms of a viral respiratory infection often start quickly. They include a fever, sore throat, and runny nose. You may also just not feel well. Or you may not want to eat much.  Most viral infections can be treated with home care. This may include drinking lots of fluids and taking over-the-counter pain medicine. You will probably feel better in 4 to 10 days.  Antibiotics are not used to treat a viral infection. Antibiotics don't kill viruses, so they won't help cure a viral illness.    - albuterol (PROVENTIL HFA) 108 (90 Base) MCG/ACT inhaler  Dispense: 8.5 g; Refill: 0  - benzonatate (TESSALON) 200 MG capsule  Dispense: 21 capsule; Refill: 0    Moderate asthma with exacerbation, unspecified whether persistent      Asthma makes it hard for you to breathe. During an asthma attack, the airways swell and narrow. Severe asthma attacks can be dangerous, but you can usually prevent them. Controlling asthma and treating symptoms before they get bad can help you avoid bad attacks.  Take medications as directed.  Follow up with your family doctor if not improving or go to the ED if symptoms worsen. '    - albuterol (PROVENTIL HFA) 108 (90 Base) MCG/ACT inhaler  Dispense: 8.5 g; Refill: 0  - predniSONE (DELTASONE) 20 MG tablet  Dispense: 10 tablet; Refill: 0    Seasonal allergic rhinitis, unspecified trigger    Allergies occur when your body's defense system (immune system) overreacts to certain substances. The immune system treats a harmless substance as if it were a harmful germ or virus. Many things can make this happen. These include pollens, medicine, food, dust, animal dander, and mold.  Allergies can be mild or severe. Mild  allergies can be managed with home treatment. But medicine may be needed to prevent problems.  Managing your allergies is an important part of staying healthy. Your doctor may suggest that you have allergy testing to help find out what is causing your allergies    - montelukast (SINGULAIR) 10 MG tablet  Dispense: 30 tablet; Refill: 0  - cetirizine (ZYRTEC) 10 MG tablet  Dispense: 30 tablet; Refill: 0       At today's visit with Mihir Casanova , we discussed results, diagnosis, medications and formulated a plan.  We also discussed red flags for immediate return to clinic/ER, as well as indications for follow up with PCP if not improved in 3 days. Patient understood and agreed to plan. Mihir Casanova was discharged with stable vitals and has no further questions.       No follow-ups on file.    Kuldip Rob, [facility], PA-C  [facility]    Subjective     Mihir Bennett is a 24 year old male who presents to clinic today for the following health issues:  Chief Complaint   Patient presents with    Asthma     Asthma flare up. Wheezing, SOB, and coughing out yellowish mucus since yesterday. Does not have a inhaler.       HPI  Review of Systems  Constitutional, HEENT, cardiovascular, pulmonary, gi and gu systems are negative, except as otherwise noted.      Objective    /72 (BP Location: Right arm, Patient Position: Sitting, Cuff Size: Adult Large)   Pulse 82   Temp 98.6  F (37  C) (Oral)   Resp 22   Wt 89.8 kg (198 lb)   SpO2 94%   BMI 30.11 kg/m    Physical Exam   GENERAL: alert and no distress  EYES: Eyes grossly normal to inspection, PERRL and conjunctivae and sclerae normal  HENT: normal cephalic/atraumatic, ear canals and TM's normal, nasal mucosa edematous , and rhinorrhea   NECK: no adenopathy, no asymmetry, masses, or scars  RESP: expiratory wheezes throughout  CV: regular rate and rhythm, normal S1 S2, no S3 or S4, no murmur, click or rub, no peripheral edema  MS: no gross  musculoskeletal defects noted, no edema  SKIN: no suspicious lesions or rashes  NEURO: Normal strength and tone, mentation intact and speech normal  PSYCH: mentation appears normal, affect normal/bright    No results found for any visits on 01/20/25.

## 2025-01-20 NOTE — LETTER
January 20, 2025      Mihir Casanova  [address]   [place] [address]        To Whom It May Concern:    Mihir Casanova  was seen today.  He will miss work tomorrow.      Sincerely,        Kuldip Rob, [facility], PA-C    Electronically signed